# Patient Record
Sex: FEMALE | Race: WHITE | NOT HISPANIC OR LATINO | Employment: FULL TIME | URBAN - METROPOLITAN AREA
[De-identification: names, ages, dates, MRNs, and addresses within clinical notes are randomized per-mention and may not be internally consistent; named-entity substitution may affect disease eponyms.]

---
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
## 2019-03-26 ENCOUNTER — LAB (OUTPATIENT)
Dept: LAB | Facility: HOSPITAL | Age: 19
End: 2019-03-26

## 2019-03-26 ENCOUNTER — CONSULT (OUTPATIENT)
Dept: CARDIOLOGY | Facility: CLINIC | Age: 19
End: 2019-03-26

## 2019-03-26 VITALS
HEIGHT: 69 IN | OXYGEN SATURATION: 99 % | SYSTOLIC BLOOD PRESSURE: 102 MMHG | DIASTOLIC BLOOD PRESSURE: 74 MMHG | WEIGHT: 273 LBS | BODY MASS INDEX: 40.43 KG/M2 | HEART RATE: 86 BPM

## 2019-03-26 DIAGNOSIS — R55 SYNCOPE AND COLLAPSE: ICD-10-CM

## 2019-03-26 DIAGNOSIS — R07.89 CHEST PAIN, MUSCULOSKELETAL: ICD-10-CM

## 2019-03-26 DIAGNOSIS — E66.01 MORBID OBESITY WITH BMI OF 40.0-44.9, ADULT (HCC): ICD-10-CM

## 2019-03-26 DIAGNOSIS — R00.2 PALPITATIONS: Primary | ICD-10-CM

## 2019-03-26 DIAGNOSIS — R00.2 PALPITATIONS: ICD-10-CM

## 2019-03-26 LAB
ANION GAP SERPL CALCULATED.3IONS-SCNC: 11 MMOL/L (ref 3–11)
BASOPHILS # BLD AUTO: 0.03 10*3/MM3 (ref 0–0.2)
BASOPHILS NFR BLD AUTO: 0.3 % (ref 0–1)
BUN BLD-MCNC: 11 MG/DL (ref 9–23)
BUN/CREAT SERPL: 15.1 (ref 7–25)
CALCIUM SPEC-SCNC: 9.5 MG/DL (ref 8.7–10.4)
CHLORIDE SERPL-SCNC: 105 MMOL/L (ref 99–109)
CO2 SERPL-SCNC: 22 MMOL/L (ref 20–31)
CREAT BLD-MCNC: 0.73 MG/DL (ref 0.6–1.3)
DEPRECATED FTI SERPL-MCNC: 2.6 TBI
DEPRECATED RDW RBC AUTO: 42 FL (ref 37–54)
EOSINOPHIL # BLD AUTO: 0.17 10*3/MM3 (ref 0–0.3)
EOSINOPHIL NFR BLD AUTO: 1.7 % (ref 0–3)
ERYTHROCYTE [DISTWIDTH] IN BLOOD BY AUTOMATED COUNT: 13.3 % (ref 11.3–14.5)
GFR SERPL CREATININE-BSD FRML MDRD: 102 ML/MIN/1.73
GLUCOSE BLD-MCNC: 76 MG/DL (ref 70–100)
HCT VFR BLD AUTO: 40.8 % (ref 34.5–44)
HGB BLD-MCNC: 13.5 G/DL (ref 11.5–15.5)
IMM GRANULOCYTES # BLD AUTO: 0.02 10*3/MM3 (ref 0–0.05)
IMM GRANULOCYTES NFR BLD AUTO: 0.2 % (ref 0–0.6)
LYMPHOCYTES # BLD AUTO: 3.14 10*3/MM3 (ref 0.6–4.8)
LYMPHOCYTES NFR BLD AUTO: 31.7 % (ref 24–44)
MCH RBC QN AUTO: 28.5 PG (ref 27–31)
MCHC RBC AUTO-ENTMCNC: 33.1 G/DL (ref 32–36)
MCV RBC AUTO: 86.3 FL (ref 80–99)
MONOCYTES # BLD AUTO: 0.5 10*3/MM3 (ref 0–1)
MONOCYTES NFR BLD AUTO: 5.1 % (ref 0–12)
NEUTROPHILS # BLD AUTO: 6.06 10*3/MM3 (ref 1.5–8.3)
NEUTROPHILS NFR BLD AUTO: 61.2 % (ref 41–71)
PLATELET # BLD AUTO: 339 10*3/MM3 (ref 150–450)
PMV BLD AUTO: 10.3 FL (ref 6–12)
POTASSIUM BLD-SCNC: 4.5 MMOL/L (ref 3.5–5.5)
RBC # BLD AUTO: 4.73 10*6/MM3 (ref 3.89–5.14)
SODIUM BLD-SCNC: 138 MMOL/L (ref 132–146)
T3RU NFR SERPL: 27 % (ref 23–37)
T4 SERPL-MCNC: 9.6 MCG/DL (ref 4.7–11.4)
TSH SERPL DL<=0.05 MIU/L-ACNC: 2.11 MIU/ML (ref 0.35–5.35)
WBC NRBC COR # BLD: 9.9 10*3/MM3 (ref 4.5–13.5)

## 2019-03-26 PROCEDURE — 80048 BASIC METABOLIC PNL TOTAL CA: CPT

## 2019-03-26 PROCEDURE — 36415 COLL VENOUS BLD VENIPUNCTURE: CPT

## 2019-03-26 PROCEDURE — 93000 ELECTROCARDIOGRAM COMPLETE: CPT | Performed by: PHYSICIAN ASSISTANT

## 2019-03-26 PROCEDURE — 84436 ASSAY OF TOTAL THYROXINE: CPT

## 2019-03-26 PROCEDURE — 85025 COMPLETE CBC W/AUTO DIFF WBC: CPT

## 2019-03-26 PROCEDURE — 84479 ASSAY OF THYROID (T3 OR T4): CPT

## 2019-03-26 PROCEDURE — 99204 OFFICE O/P NEW MOD 45 MIN: CPT | Performed by: PHYSICIAN ASSISTANT

## 2019-03-26 PROCEDURE — 84443 ASSAY THYROID STIM HORMONE: CPT

## 2019-03-26 NOTE — PROGRESS NOTES
"[facility] at [facility]  INITIAL OFFICE CONSULT      Tracey Merrill  2000  PCP: Aron Tate MD    SUBJECTIVE:   Tracey Merrill is a 18 y.o. female seen for a consultation visit regarding the following:     Chief Complaint:   Chief Complaint   Patient presents with   • Dizziness     CONSULT   • Irregular Heart Beat   • Nausea   • Palpitations        Consultation is requested by Jose Healy MD for evaluation of Dizziness (CONSULT); Irregular Heart Beat; Nausea; and Palpitations    History:  The patient is a pleasant 18-year-old female presents today for follow-up at the request of her pediatrician Dr. Tate regarding history of near syncope and syncope events.  She is accompanied by her mother.  She states that she initially had syncope episodes dating back to when she was in high school.  However the episodes were very infrequent.  Recently these have increased in nature to the point where she has had 3 or 4 in the past 6 months.  She states usually these occur when she is standing she feels fatigued weeks she begins to feel nauseous diaphoretic and she feels as though she may go out she has to sit down or rest.  During this time she also feels that her heart rate is racing.  She has had a few episodes where her heart seems to be racing for no reason most the time this is when she is up standing but she has had occasional episodes when she is lying down on the bed.  She sometimes can feel her pulse in her \"legs\".  She denies sudden random syncope events with no warning signs she typically has a prodrome of symptoms.  She denies any chest pain with exertion suggesting angina.  She was treated for muscle skeletal chest pain last month which is now resolved.  She states that she been very labile in her diet she sometimes \"fast\" for hours and sometimes work out in the gym for a long period of time without much rest.  She feels sometimes these episodes are triggered by \"low " "blood sugar\".  She states that she has regular periods on birth control denies that she is pregnant.  She did present to her primary care provider on 319 and during this office visit she was told that her blood pressure dropped when she stood up.  She presents today for further evaluation regarding these events.  She denies she is never had any seizure disorder or seizure-like activity.  It is important also notes she has had recent trouble with headaches and she sometimes sees vision changes with these headaches but she is not sure if she has had prior diagnosis of migraine headaches.      Cardiac PMH: (Old records have been reviewed and summarized below)  1. Vasovagal syncope  2. Palpitations  3. Mild Obesity  4. Anxiety   5. Frequent headaches, possible migraines.  6. Marginal SBP      Past Medical History, Past Surgical History, Family history, Social History, and Medications were all reviewed with the patient today and updated as necessary.     No current outpatient medications on file.     No current facility-administered medications for this visit.      No Known Allergies      Past Medical History:   Diagnosis Date   • Depression      History reviewed. No pertinent surgical history.  Family History   Problem Relation Age of Onset   • No Known Problems Mother    • No Known Problems Father    • Aneurysm Paternal Grandmother    • Aneurysm Paternal Grandfather      Social History     Tobacco Use   • Smoking status: Never Smoker   • Smokeless tobacco: Never Used   Substance Use Topics   • Alcohol use: No     Frequency: Never       ROS:  Review of Symptoms:  General: episodes ofweight loss/gain,with binge eating.   Skin: no rashes, lumps, or other skin changes  HEENT: + dizziness, lightheadedness, + vision changes. + headaches  Respiratory: no cough or hemoptysis  Cardiovascular: + palpitations, and tachycardia  Gastrointestinal: no black/tarry stools or diarrhea  Urinary: no change in frequency or " "urgency  Peripheral Vascular: no claudication or leg cramps  Musculoskeletal: no muscle or joint pain/stiffness  Psychiatric: + anxiety   Neurological: no sensory or motor loss, no syncope  Hematologic: no anemia, easy bruising or bleeding  Endocrine: no thyroid problems, nor heat or cold intolerance         PHYSICAL EXAM:   /74 (BP Location: Left arm, Patient Position: Sitting)   Pulse 86   Ht 175.3 cm (69\")   Wt 124 kg (273 lb)   SpO2 99%   BMI 40.32 kg/m²      Wt Readings from Last 5 Encounters:   03/26/19 124 kg (273 lb)   09/18/14 101 kg (223 lb 2 oz) (>99 %, Z= 2.33)*     * Growth percentiles are based on [facility] (Girls, 2-20 Years) data.     BP Readings from Last 5 Encounters:   03/26/19 102/74   09/18/14 110/82 (49 %, Z = -0.03 /  94 %, Z = 1.54)*     *BP percentiles are based on the August 2017 AAP Clinical Practice Guideline for girls       General-Well Nourished, Well developed  Eyes - PERRLA  Neck- supple, No mass  CV- regular rate and rhythm, no MRG  Lung- clear bilaterally  Abd- soft, +BS  Musc/skel - Norm strength and range of motion  Skin- warm and dry  Neuro - Alert & Oriented x 3, appropriate mood.    Medical problems and test results were reviewed with the patient today.     Results for orders placed or performed in visit on 11/02/17   C-reactive Protein   Result Value Ref Range    C-Reactive Protein 0.80 0.00 - 1.00 mg/dL   Comprehensive Metabolic Panel   Result Value Ref Range    Glucose 82 70 - 100 mg/dL    BUN 10 9 - 23 mg/dL    Creatinine 0.70 0.60 - 1.30 mg/dL    Sodium 142 132 - 146 mmol/L    Potassium 4.0 3.5 - 5.5 mmol/L    Chloride 109 99 - 109 mmol/L    CO2 26.0 20.0 - 31.0 mmol/L    Calcium 9.3 8.7 - 10.4 mg/dL    Total Protein 6.0 5.7 - 8.2 g/dL    Albumin 4.00 3.20 - 4.80 g/dL    ALT (SGPT) 22 7 - 40 U/L    AST (SGOT) 17 0 - 33 U/L    Alkaline Phosphatase 96 25 - 100 U/L    Total Bilirubin 0.5 0.3 - 1.2 mg/dL    eGFR Non African Amer 108 >60 mL/min/1.73    Globulin 2.0 gm/dL "    A/G Ratio 2.0 1.5 - 2.5 g/dL    BUN/Creatinine Ratio 14.3 7.0 - 25.0    Anion Gap 7.0 3.0 - 11.0 mmol/L   Insulin, Total   Result Value Ref Range    Insulin 16.8 2.6 - 24.9 uIU/mL   Hemoglobin A1c   Result Value Ref Range    Hemoglobin A1C 5.00 4.80 - 5.60 %   T4, Free   Result Value Ref Range    Free T4 1.09 0.89 - 1.76 ng/dL   TSH   Result Value Ref Range    TSH 2.275 0.350 - 5.350 mIU/mL   CBC Auto Differential   Result Value Ref Range    WBC 7.45 4.50 - 13.50 10*3/mm3    RBC 4.26 3.89 - 5.14 10*6/mm3    Hemoglobin 11.8 11.5 - 15.5 g/dL    Hematocrit 36.3 34.5 - 44.0 %    MCV 85.2 80.0 - 99.0 fL    MCH 27.7 27.0 - 31.0 pg    MCHC 32.5 32.0 - 36.0 g/dL    RDW 14.8 (H) 11.3 - 14.5 %    RDW-SD 46.0 37.0 - 54.0 fl    MPV 10.9 6.0 - 12.0 fL    Platelets 252 150 - 450 10*3/mm3    Neutrophil % 47.3 41.0 - 71.0 %    Lymphocyte % 44.7 (H) 24.0 - 44.0 %    Monocyte % 4.8 0.0 - 12.0 %    Eosinophil % 2.4 0.0 - 3.0 %    Basophil % 0.7 0.0 - 1.0 %    Immature Grans % 0.1 0.0 - 0.6 %    Neutrophils, Absolute 3.52 1.50 - 8.30 10*3/mm3    Lymphocytes, Absolute 3.33 0.60 - 4.80 10*3/mm3    Monocytes, Absolute 0.36 0.00 - 1.00 10*3/mm3    Eosinophils, Absolute 0.18 0.00 - 0.30 10*3/mm3    Basophils, Absolute 0.05 0.00 - 0.20 10*3/mm3    Immature Grans, Absolute 0.01 0.00 - 0.03 10*3/mm3         No results found for: CHOL, HDL, HDLC, LDL, LDLC, VLDL    EKG:  (EKG/Tracing has been independently visualized by me and summarized below)      ECG 12 Lead  Date/Time: 3/26/2019 4:28 PM  Performed by: Uriel Chin PA  Authorized by: Uriel Chin PA   Rhythm: sinus rhythm  Rate: normal  Conduction: conduction normal  QRS axis: normal    Clinical impression: normal ECG            ASSESSMENT   1. Near syncope, Vasovagal.   2. Marginal SBP  3. Moderate Obesity  4. Questionable Migraines  5. Palpitations    PLAN  · Today I had a long discussion with the patient and her mother  with her today in the office.  We discussed signs  and symptoms of vasovagal syncope versus possible pots syndrome.  We have recommended that she stop her fasting diets and pursue a regular diet with regular meals, reduce caffeine intake with improved sleep habbits.  In addition to supplement this would like her to increase her hydration with Gatorade and Powerade as well as increase her sodium intake. She will wear ALLISON hose at work.   · Echocardiogram for LV function  · MCOT monitor regarding random episodes of palpitations. Suspect some of her symptoms are secondary low BP.   · Routine laboratory data including CBC, BMP, thyroid panel  · Tilt table study  · Further recommendations following results of above study and lifestyle modifications.  If no improvement consider Florinef therapy.   · Follow-up in [facility] in approximately 1 month or sooner as needed.     Cardiology/Electrophysiology  03/26/19  4:35 PM  Will Elsy AGUAYO

## 2019-03-27 ENCOUNTER — DOCUMENTATION (OUTPATIENT)
Dept: CARDIOLOGY | Facility: CLINIC | Age: 19
End: 2019-03-27

## 2019-04-30 ENCOUNTER — HOSPITAL ENCOUNTER (OUTPATIENT)
Dept: CARDIOLOGY | Facility: HOSPITAL | Age: 19
Discharge: HOME | End: 2019-04-30
Admitting: PHYSICIAN ASSISTANT

## 2019-04-30 ENCOUNTER — APPOINTMENT (OUTPATIENT)
Dept: CARDIOLOGY | Facility: HOSPITAL | Age: 19
End: 2019-04-30

## 2019-04-30 DIAGNOSIS — R55 SYNCOPE AND COLLAPSE: ICD-10-CM

## 2019-04-30 DIAGNOSIS — R00.2 PALPITATIONS: ICD-10-CM

## 2019-04-30 PROCEDURE — 93660 TILT TABLE EVALUATION: CPT

## 2019-04-30 PROCEDURE — 93660 TILT TABLE EVALUATION: CPT | Performed by: INTERNAL MEDICINE

## 2019-05-16 ENCOUNTER — OFFICE VISIT (OUTPATIENT)
Dept: CARDIOLOGY | Facility: CLINIC | Age: 19
End: 2019-05-16

## 2019-05-16 VITALS
HEIGHT: 69 IN | WEIGHT: 272 LBS | DIASTOLIC BLOOD PRESSURE: 78 MMHG | SYSTOLIC BLOOD PRESSURE: 110 MMHG | HEART RATE: 81 BPM | BODY MASS INDEX: 40.29 KG/M2

## 2019-05-16 DIAGNOSIS — R00.2 PALPITATIONS: ICD-10-CM

## 2019-05-16 DIAGNOSIS — R55 SYNCOPE AND COLLAPSE: Primary | ICD-10-CM

## 2019-05-16 PROCEDURE — 99214 OFFICE O/P EST MOD 30 MIN: CPT | Performed by: INTERNAL MEDICINE

## 2019-05-16 NOTE — PROGRESS NOTES
"Tracey Merrill  2000  [phone]    05/16/2019    [facility] CARDIOLOGY     Aron Tate MD  [address] [place] [address]   [place] [address]    Chief Complaint   Patient presents with   • Palpitations   • Dizziness       Problem List:   1. Vasovagal Syncope  a. Event Monitor 3/26/19-4/24/19: NSR with sinus tachycardia, up to 132 bpm. Symptoms correlated with Sinus Tachycardia  b. Tilt Table Test 4/30/19: positive vasodepressor response at 19 minutes of head up tilt, test terminated due to syncope. BP dropped to 72/52 just prior to syncope, HR 57  2. Mild obesity  3. Anxiety  4. Frequent HAs  No past surgical history on file.          Allergies  Allergies   Allergen Reactions   • Latex Rash     PT stated allergic to latex powder       Current Medications  No current outpatient medications on file.    History of Present Illness     Pt presents for follow up of vasovagal syncope. Since we last saw the pt, she had a tilt table test on 4/30/19 which was positive. She wore an event monitor which showed ST. The week after the tilt table, she had a couple episodes of syncope, both while sitting. She has warning signs of tunnel vision and visual disturbances, along with nausea, and diaphoresis. She denies any recent palpitations, CP, SOB other than when she has vaso vagal episodes. No recent hospitalzations or ER visits. She hydrates well and states that it does help. She had normal labs with TSH, BMP, and CBC.     ROS:  General:  +  Fatigue, - weight gain or loss  Cardiovascular:  Denies CP, PND,+ syncope,+ near syncope, - edema + palpitations.  Pulmonary:  Denies MALONEY, cough, or wheezing      Vitals:    05/16/19 1026   BP: 110/78   BP Location: Left arm   Patient Position: Sitting   Pulse: 81   Weight: 123 kg (272 lb)   Height: 175.3 cm (69\")     Body mass index is 40.17 kg/m².  PE:  General: NAD. A & O x 3  Neck: no JVD, no carotid bruits, no TM  Heart RRR, NL S1, S2, S4 present, " no rubs, murmurs  Lungs: CTA, no wheezes, rhonchi, or rales  Abd: soft, non-tender, NL BS  Ext: No musculoskeletal deformities, no edema, cyanosis, or clubbing  Psych: normal mood and affect    Diagnostic Data:      Procedures    1. Syncope and collapse    2. Palpitations          Plan:  1. Vasovagal Syncope: Discussed all the triggers in detail with her and mom. Continue fluids, salt, exercise  - will start midodrine 5 mg po bid  - echocardiogram ordered, patient wants to have it done at [place].     2. Sinus tachycardia/Palpitations:   - symptoms mild    F/up in 6 months    Scribed for Juwan Jenkins MD by Latanya Medel PA-C. 5/16/2019  11:06 AM     I, Juwan Jenkins MD, personally performed the services described in this documentation as scribed by the above named individual in my presence, and it is both accurate and complete.  5/16/2019  11:17 AM

## 2019-05-20 RX ORDER — MIDODRINE HYDROCHLORIDE 5 MG/1
5 TABLET ORAL 2 TIMES DAILY
Qty: 60 TABLET | Refills: 11 | Status: SHIPPED | OUTPATIENT
Start: 2019-05-20 | End: 2019-10-17 | Stop reason: SINTOL

## 2019-10-17 ENCOUNTER — OFFICE VISIT (OUTPATIENT)
Dept: CARDIOLOGY | Facility: CLINIC | Age: 19
End: 2019-10-17

## 2019-10-17 VITALS
BODY MASS INDEX: 41.03 KG/M2 | HEART RATE: 88 BPM | DIASTOLIC BLOOD PRESSURE: 88 MMHG | SYSTOLIC BLOOD PRESSURE: 110 MMHG | HEIGHT: 69 IN | OXYGEN SATURATION: 95 % | WEIGHT: 277 LBS

## 2019-10-17 DIAGNOSIS — R00.2 PALPITATIONS: ICD-10-CM

## 2019-10-17 DIAGNOSIS — R55 SYNCOPE AND COLLAPSE: Primary | ICD-10-CM

## 2019-10-17 PROCEDURE — 99213 OFFICE O/P EST LOW 20 MIN: CPT | Performed by: PHYSICIAN ASSISTANT

## 2019-10-17 NOTE — PROGRESS NOTES
"Tracey Merrill  2000  [phone]    10/17/2019    [facility] CARDIOLOGY     Aron Tate MD  [address]   [place] [address]    Chief Complaint   Patient presents with   • Loss of Consciousness       Problem List:   1. Vasovagal Syncope/Orthostatic  a. Event Monitor 3/26/19-4/24/19: NSR with sinus tachycardia, up to 132 bpm. Symptoms correlated with Sinus Tachycardia  b. Tilt Table Test 4/30/19: positive vasodepressor response at 19 minutes of head up tilt, test terminated due to syncope. BP dropped to 72/52 just prior to syncope, HR 57  2. Mild obesity  3. Anxiety  4. Frequent HAs    Allergies  Allergies   Allergen Reactions   • Midodrine Hcl Itching     Itching and chill bumps   • Latex Rash     PT stated allergic to latex powder       Current Medications  No current outpatient medications on file.    History of Present Illness     Pt presents for follow up of VVS and tachycardia. Since we last saw the pt, she stopped taking Midodrine as she was intolerant with chills bumps and itching. She has overall been stable and knows when she feels like she might pass out and is able to sit down. She had one syncopal spell when she had a GI illness. She states that she can deal with it. She denies palpitations or tachycardia. She denies CP, SOB. No recent hospitalizations or ER visits.     ROS:  General:  Denies fatigue, weight gain or loss  Cardiovascular:  Denies CP, PND, + syncope, + near syncope, - edema - palpitations.  Pulmonary:  Denies MALONEY, cough, or wheezing      Vitals:    10/17/19 1113   BP: 110/88   BP Location: Left arm   Patient Position: Sitting   Pulse: 88   SpO2: 95%   Weight: 126 kg (277 lb)   Height: 175.3 cm (69\")     Body mass index is 40.91 kg/m².  PE:  General: NAD. A & O x 3  Neck: no JVD, no carotid bruits, no TM  Heart RRR, NL S1, S2, S4 present, no rubs, murmurs  Lungs: CTA, no wheezes, rhonchi, or rales  Abd: soft, non-tender, NL " BS  Ext: No musculoskeletal deformities, no edema, cyanosis, or clubbing  Psych: normal mood and affect    Diagnostic Data:      Procedures    1. Syncope and collapse    2. Palpitations          Plan:  1. Vasovagal Syncope:   - off Midodrine due to side effects, but did not stay on it long enough. Gave her options to re challenge Midodrine vs lifestyle modification vs florinef. She would like to try lifestyle modification for now. Continue fluids, salt, and exercise.   - needs echocardiogram. She wants it done in [place]. Will have our  set that up. It is already ordered.      2. Sinus tachycardia/Palpitations:   - symptoms mild     F/up in 6 months    Electronically signed by NICO Alonso, 10/17/19, 12:02 PM.

## 2020-06-18 ENCOUNTER — OFFICE VISIT (OUTPATIENT)
Dept: CARDIOLOGY | Facility: CLINIC | Age: 20
End: 2020-06-18

## 2020-06-18 VITALS
OXYGEN SATURATION: 98 % | SYSTOLIC BLOOD PRESSURE: 108 MMHG | HEIGHT: 69 IN | DIASTOLIC BLOOD PRESSURE: 70 MMHG | HEART RATE: 103 BPM | WEIGHT: 290 LBS | BODY MASS INDEX: 42.95 KG/M2

## 2020-06-18 DIAGNOSIS — R55 SYNCOPE AND COLLAPSE: Primary | ICD-10-CM

## 2020-06-18 DIAGNOSIS — R00.0 SINUS TACHYCARDIA: ICD-10-CM

## 2020-06-18 PROCEDURE — 99213 OFFICE O/P EST LOW 20 MIN: CPT | Performed by: INTERNAL MEDICINE

## 2020-06-18 RX ORDER — METFORMIN HYDROCHLORIDE 500 MG/1
TABLET, EXTENDED RELEASE ORAL EVERY OTHER DAY
COMMUNITY
Start: 2020-06-01 | End: 2022-02-01

## 2020-06-18 NOTE — PROGRESS NOTES
"Tracey Merrill  2000  [phone]      06/18/2020    [facility] CARDIOLOGY     Aron Tate MD  [address]   [place] [address]    Chief Complaint   Patient presents with   • Loss of Consciousness       No problems updated.     Problem List:   1. Vasovagal Syncope/Orthostatic  a. Event Monitor 3/26/19-4/24/19: NSR with sinus tachycardia, up to 132 bpm. Symptoms correlated with Sinus Tachycardia  b. Tilt Table Test 4/30/19: positive vasodepressor response at 19 minutes of head up tilt, test terminated due to syncope. BP dropped to 72/52 just prior to syncope, HR 57  2. Mild obesity  3. Anxiety  4. Frequent HAs    Allergies  Allergies   Allergen Reactions   • Midodrine Hcl Itching     Itching and chill bumps   • Latex Rash     PT stated allergic to latex powder       Current Medications    Current Outpatient Medications:   •  metFORMIN ER (GLUCOPHAGE-XR) 500 MG 24 hr tablet, Take  by mouth Every Other Day., Disp: , Rfl:     History of Present Illness   HPI    Pt presents for follow up of VVS. Since the pt has seen us, pt denies any kennedy syncopal episodes, SOB, CP.  He has had some intermittent episodes of lightheadedness but they have been overall mild.  No sustained tachyarrhythmias.  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  He did not get her echocardiogram that we ordered last time as it was denied by the insurance.  Denies any hospitalizations or ER visits. Overall feels well.    ROS:  General:  + mild fatigue, no weight gain or loss  Cardiovascular:  Denies CP, PND, syncope, near syncope, edema or palpitations.  Pulmonary:  Denies MALONEY, cough, or wheezing    Vitals:    06/18/20 1159   BP: 108/70   BP Location: Left arm   Patient Position: Sitting   Pulse: 103   SpO2: 98%   Weight: 132 kg (290 lb)   Height: 175.3 cm (69\")       PE:  General: NAD  Neck: no JVD, no carotid bruits, no TM  Heart RRR, NL S1, S2, no rubs, " murmurs  Lungs: CTA, no wheezes, rhonchi, or rales  Abd: soft, non-tender, NL BS  Ext: No musculoskeletal deformities, no edema, cyanosis, or clubbing  Psych: normal mood and affect    Diagnostic Data:  Procedures    1. Syncope and collapse    2. Sinus tachycardia        Plan:  1. Vasovagal Syncope: Doing reasonably well at this time with episodes of lightheadedness but no kennedy syncope.  She needs an echocardiogram for evaluation of her syncope.  We will reattempt to obtain echocardiogram through her insurance company.       2. Sinus tachycardia/Palpitations:   - symptoms mild     F/up in 12 months

## 2021-07-13 DIAGNOSIS — R00.0 SINUS TACHYCARDIA: ICD-10-CM

## 2021-07-13 DIAGNOSIS — R55 SYNCOPE AND COLLAPSE: Primary | ICD-10-CM

## 2021-07-13 DIAGNOSIS — R00.2 PALPITATIONS: ICD-10-CM

## 2021-07-15 ENCOUNTER — OFFICE VISIT (OUTPATIENT)
Dept: CARDIOLOGY | Facility: CLINIC | Age: 21
End: 2021-07-15

## 2021-07-15 VITALS
BODY MASS INDEX: 43.4 KG/M2 | OXYGEN SATURATION: 96 % | HEIGHT: 69 IN | HEART RATE: 93 BPM | WEIGHT: 293 LBS | DIASTOLIC BLOOD PRESSURE: 84 MMHG | SYSTOLIC BLOOD PRESSURE: 112 MMHG

## 2021-07-15 DIAGNOSIS — R00.0 SINUS TACHYCARDIA: ICD-10-CM

## 2021-07-15 DIAGNOSIS — R55 SYNCOPE AND COLLAPSE: Primary | ICD-10-CM

## 2021-07-15 DIAGNOSIS — R00.2 PALPITATIONS: ICD-10-CM

## 2021-07-15 PROCEDURE — 99213 OFFICE O/P EST LOW 20 MIN: CPT | Performed by: INTERNAL MEDICINE

## 2021-07-15 NOTE — PROGRESS NOTES
"Tracey Merrill  2000  [phone]      07/15/2021    [facility] CARDIOLOGY     Provider, No Known  [facility]  [place] [address]    Chief Complaint   Patient presents with   • syncope and collapse       Problem List:   1. Vasovagal Syncope/Orthostatic  a. Event Monitor 3/26/19-4/24/19: NSR with sinus tachycardia, up to 132 bpm. Symptoms correlated with Sinus Tachycardia  b. Tilt Table Test 4/30/19: positive vasodepressor response at 19 minutes of head up tilt, test terminated due to syncope. BP dropped to 72/52 just prior to syncope, HR 57  2. Mild obesity  3. Anxiety  4. DMr  5. Frequent HAs      Allergies  Allergies   Allergen Reactions   • Midodrine Hcl Itching     Itching and chill bumps   • Latex Rash     PT stated allergic to latex powder       Current Medications    Current Outpatient Medications:   •  metFORMIN ER (GLUCOPHAGE-XR) 500 MG 24 hr tablet, Take  by mouth Every Other Day., Disp: , Rfl:     History of Present Illness   HPI    Pt presents for follow up of VVS/ST. Since the pt has seen us, pt denies any syncopal episodes, SOB, CP.  She does have periods of time where she develops lightheadedness but no near syncope.  She denies any significant tachypalpitations.  She does not check her blood pressure or heart rates routinely at home.  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  Denies any hospitalizations or ER visits. Overall feels ok set for chronic nasal drainage.  She has been started on Metformin since we have last seen her for prediabetic situation    ROS:  General:+  fatigue, + weight gain  Cardiovascular:  Denies CP, PND, syncope, near syncope, edema or palpitations.  Pulmonary:  Denies MALONEY, cough, or wheezing    Vitals:    07/15/21 1015   BP: 112/84   BP Location: Left arm   Patient Position: Sitting   Pulse: 93   SpO2: 96%   Weight: (!) 153 kg (338 lb)   Height: 175.3 cm (69\")       PE:  General: NAD  Neck: no JVD, " no carotid bruits, no TM  Heart RRR, NL S1, S2,  no rubs, murmurs  Lungs: CTA, no wheezes, rhonchi, or rales  Abd: soft, non-tender, NL BS  Ext: No musculoskeletal deformities, no edema, cyanosis, or clubbing  Psych: normal mood and affect    Diagnostic Data:  Procedures    1. Syncope and collapse    2. Palpitations    3. Sinus tachycardia        Plan:  1. Vasovagal Syncope: Doing reasonably well at this time with episodes of lightheadedness but no kennedy syncope.  She needs an echocardiogram for evaluation of her syncope which will be arranged through Dr. Dennison's office as an outpatient.  Encourage weight loss, exercise, routine salt use, and maintaining hydration.        2. Sinus tachycardia/Palpitations: Asymptomatic at this time    F/up in 12 months

## 2022-02-01 ENCOUNTER — OFFICE VISIT (OUTPATIENT)
Dept: PULMONOLOGY | Facility: CLINIC | Age: 22
End: 2022-02-01

## 2022-02-01 VITALS
RESPIRATION RATE: 18 BRPM | SYSTOLIC BLOOD PRESSURE: 114 MMHG | DIASTOLIC BLOOD PRESSURE: 80 MMHG | WEIGHT: 293 LBS | HEART RATE: 92 BPM | OXYGEN SATURATION: 97 % | HEIGHT: 69 IN | TEMPERATURE: 97.8 F | BODY MASS INDEX: 43.4 KG/M2

## 2022-02-01 DIAGNOSIS — R91.1 PULMONARY NODULE: ICD-10-CM

## 2022-02-01 DIAGNOSIS — F17.200 TOBACCO DEPENDENCE: ICD-10-CM

## 2022-02-01 DIAGNOSIS — R06.02 SHORTNESS OF BREATH: Primary | ICD-10-CM

## 2022-02-01 DIAGNOSIS — J41.1 BRONCHITIS, MUCOPURULENT RECURRENT: ICD-10-CM

## 2022-02-01 PROCEDURE — 94726 PLETHYSMOGRAPHY LUNG VOLUMES: CPT | Performed by: INTERNAL MEDICINE

## 2022-02-01 PROCEDURE — 94010 BREATHING CAPACITY TEST: CPT | Performed by: INTERNAL MEDICINE

## 2022-02-01 PROCEDURE — 99205 OFFICE O/P NEW HI 60 MIN: CPT | Performed by: INTERNAL MEDICINE

## 2022-02-01 PROCEDURE — 94729 DIFFUSING CAPACITY: CPT | Performed by: INTERNAL MEDICINE

## 2022-02-01 NOTE — PROGRESS NOTES
Initial Pulmonary Consult Note    Subjective   Reason for consultation/Chief Complaint: Abnormal Chest Xray    Tracey Merrill is a 21 y.o. female is being seen for consultation today at the request of CAROLINE Corley    History of Present Illness    Ms. Merrill is a 22yo F who is referred for an abnormal Chest xray. She was sick in December with a bad bronchitis and had a CXR performed which showed a right lower lobe pulmonary nodule. She is now over her bronchitis. She reports that she quit smoking 3 weeks ago.     Active Ambulatory Problems     Diagnosis Date Noted   • Syncope and collapse 2019   • Palpitations 2019   • Chest pain, musculoskeletal 2019   • Morbid obesity with BMI of 40.0-44.9, adult ([facility]) 2019   • Pulmonary nodule 2022   • Bronchitis, mucopurulent recurrent ([facility]) 2022   • Tobacco dependence 2022     Resolved Ambulatory Problems     Diagnosis Date Noted   • No Resolved Ambulatory Problems     Past Medical History:   Diagnosis Date   • Depression        History reviewed. No pertinent surgical history.    Family History   Problem Relation Age of Onset   • Aneurysm Paternal Grandmother    • Aneurysm Paternal Grandfather        Social History     Socioeconomic History   • Marital status: Single   Tobacco Use   • Smoking status: Former Smoker     Packs/day: 1.00     Years: 4.00     Pack years: 4.00     Types: Cigarettes     Quit date: 2022     Years since quittin.0   • Smokeless tobacco: Never Used   • Tobacco comment: 4 CAD   Substance and Sexual Activity   • Alcohol use: No   • Drug use: No   • Sexual activity: Defer       Allergies   Allergen Reactions   • Midodrine Hcl Itching     Itching and chill bumps   • Latex Rash     PT stated allergic to latex powder       No current outpatient medications on file.     No current facility-administered medications for this visit.       Review of Systems   Constitutional: Negative.    HENT: Negative.  "   Eyes: Negative.    Respiratory: Positive for cough.    Cardiovascular: Negative.    Gastrointestinal: Negative.    Endocrine: Negative.    Genitourinary: Negative.    Musculoskeletal: Negative.    Skin: Negative.    Allergic/Immunologic: Negative.    Neurological: Negative.    Hematological: Negative.    Psychiatric/Behavioral: Negative.          Objective   Blood pressure 114/80, pulse 92, temperature 97.8 °F (36.6 °C), resp. rate 18, height 175.3 cm (69\"), weight (!) 151 kg (332 lb), SpO2 97 %.  Physical Exam  Vitals and nursing note reviewed.   Constitutional:       General: She is not in acute distress.     Appearance: She is well-developed. She is obese.   HENT:      Head: Normocephalic and atraumatic.   Eyes:      General: No scleral icterus.     Conjunctiva/sclera: Conjunctivae normal.      Pupils: Pupils are equal, round, and reactive to light.   Neck:      Thyroid: No thyromegaly.      Trachea: No tracheal deviation.   Cardiovascular:      Rate and Rhythm: Normal rate and regular rhythm.      Heart sounds: Normal heart sounds.   Pulmonary:      Effort: Pulmonary effort is normal. No respiratory distress.      Breath sounds: Normal breath sounds.   Abdominal:      General: Bowel sounds are normal.      Palpations: Abdomen is soft.      Tenderness: There is no abdominal tenderness.   Musculoskeletal:         General: Normal range of motion.      Cervical back: Normal range of motion and neck supple.   Lymphadenopathy:      Cervical: No cervical adenopathy.   Skin:     General: Skin is warm and dry.      Findings: No erythema or rash.   Neurological:      Mental Status: She is alert and oriented to person, place, and time.      Motor: No abnormal muscle tone.      Coordination: Coordination normal.   Psychiatric:         Speech: Speech normal.         Behavior: Behavior normal.         Judgment: Judgment normal.         PFTs:  Performed in clinic and personally reviewed.   There is no airway " obstruction.  The lung volumes are normal.  The DLCO is normal.     Imaging:  Chest xray performed in clinic and personally reviewed. This is a PA/Lateral film. The cardiac and mediastinal contours are within normal limits. There is a well circumscribed 2cm nodule in the right lower lobe. The lungs are otherwise clear. There is no pneumothorax or pleural effusion.    Impression: 2cm right lower lobe nodule.     Assessment/Plan   Diagnoses and all orders for this visit:    1. Shortness of breath (Primary)  -     XR Chest PA & Lateral  -     Pulmonary Function Test    2. Pulmonary nodule    3. Bronchitis, mucopurulent recurrent (HCC)    4. Tobacco dependence        Discussion:  Ms. Merrill is a 20yo F who is referred for an abnormal chest xray.     1. Right Lower Lobe Pulmonary Nodule  - 2cm right lower lobe pulmonary nodule which appears benign.   - We will get a non-contrast CT scan of the chest for further evaluation.     2. Recurrent Bronchitis  - She has a history of recurrent bronchitis while she was smoking.   - We will monitor for now as she recently quit smoking.   - If she continues to have episodes of bronchitis, she may benefit from ICS inhaler therapy.     3. Tobacco Dependence  - Quit smoking x 3 weeks.          I have spent 63 minutes reviewing the patient record, face to face with the patient and her mother in discussion of test results and plans for further management and in documentation and coordination of care.       Patti RINALDI Case, DO  Pulmonary and Critical Care Medicine  Note Electronically Signed

## 2022-02-02 DIAGNOSIS — R91.1 PULMONARY NODULE: Primary | ICD-10-CM

## 2022-02-17 ENCOUNTER — APPOINTMENT (OUTPATIENT)
Dept: CT IMAGING | Facility: HOSPITAL | Age: 22
End: 2022-02-17

## 2022-02-28 ENCOUNTER — HOSPITAL ENCOUNTER (OUTPATIENT)
Dept: CT IMAGING | Facility: HOSPITAL | Age: 22
Discharge: HOME | End: 2022-02-28
Admitting: NURSE PRACTITIONER

## 2022-02-28 DIAGNOSIS — R91.1 PULMONARY NODULE: ICD-10-CM

## 2022-02-28 PROCEDURE — 71250 CT THORAX DX C-: CPT

## 2022-03-04 DIAGNOSIS — R91.1 PULMONARY NODULE: Primary | ICD-10-CM

## 2022-07-21 ENCOUNTER — OFFICE VISIT (OUTPATIENT)
Dept: CARDIOLOGY | Facility: CLINIC | Age: 22
End: 2022-07-21

## 2022-07-21 ENCOUNTER — TELEPHONE (OUTPATIENT)
Dept: CARDIOLOGY | Facility: CLINIC | Age: 22
End: 2022-07-21

## 2022-07-21 VITALS
WEIGHT: 293 LBS | HEART RATE: 90 BPM | OXYGEN SATURATION: 98 % | SYSTOLIC BLOOD PRESSURE: 120 MMHG | BODY MASS INDEX: 41.95 KG/M2 | HEIGHT: 70 IN | DIASTOLIC BLOOD PRESSURE: 90 MMHG

## 2022-07-21 DIAGNOSIS — R55 SYNCOPE AND COLLAPSE: Primary | ICD-10-CM

## 2022-07-21 DIAGNOSIS — R00.2 PALPITATIONS: ICD-10-CM

## 2022-07-21 PROCEDURE — 99213 OFFICE O/P EST LOW 20 MIN: CPT | Performed by: INTERNAL MEDICINE

## 2022-07-21 NOTE — PROGRESS NOTES
"Tracey Merrill  2000  [phone]     07/21/2022    [facility] CARDIOLOGY     Referring Provider: No ref. provider found     Provider, No Known  [place] [id number]    Chief Complaint   Patient presents with   • syncope and collapse        Problem List:     1. Vasovagal Syncope/Orthostatic  a. Event Monitor 3/26/19-4/24/19: NSR with sinus tachycardia, up to 132 bpm. Symptoms correlated with Sinus Tachycardia  b. Tilt Table Test 4/30/19: positive vasodepressor response at 19 minutes of head up tilt, test terminated due to syncope. BP dropped to 72/52 just prior to syncope, HR 57  c. Echocardiogram 9/2021: EF 65%, no significant valve abnormalities   2. Mild obesity  3. Anxiety  4. DMr  5. Frequent HAs      Allergies  Allergies   Allergen Reactions   • Midodrine Hcl Itching     Itching and chill bumps   • Latex Rash     PT stated allergic to latex powder       Current Medications  No current outpatient medications on file.    History of Present Illness:     Pt presents for follow up of VVS. Since we last saw the pt, pt denies any tachycardia, SOB, CP, LH, and dizziness, syncope. Denies any hospitalizations, ER visits, bleeding, or TIA/CVA symptoms. Overall feels well. She is trying to lose weight. She has lost 30 lbs. She is interested in   saxenda and wegovy.      ROS:  General:  Denies fatigue, weight gain or loss  Cardiovascular:  Denies CP, PND, syncope, near syncope, edema or palpitations.  Pulmonary:  Denies MALONEY, cough, or wheezing      Vitals:    07/21/22 1334   BP: 120/90   BP Location: Left arm   Patient Position: Sitting   Pulse: 90   SpO2: 98%   Weight: (!) 149 kg (328 lb)   Height: 176.5 cm (69.5\")     Body mass index is 47.74 kg/m².  PE:  General: NAD  Neck: no JVD, no carotid bruits, no TM  Heart RRR, NL S1, S2, S4 present, no rubs, murmurs  Lungs: CTA, no wheezes, rhonchi, or rales  Abd: soft, non-tender, NL BS  Ext: No musculoskeletal deformities, no " edema, cyanosis, or clubbing  Psych: normal mood and affect    Diagnostic Data:      Procedures    1. Syncope and collapse    2. Palpitations          Plan:  1. Vasovagal Syncope:  -  No recurrent events.   -  Encourage weight loss, exercise, routine salt use, and maintaining hydration. She wants to take a weight loss medication. I will research them and call her and let her know.         2. Sinus tachycardia/Palpitations: Asymptomatic at this time    F/up in 6 months    Electronically signed by NICO Alonso, 07/21/22, 1:58 PM EDT.

## 2022-07-21 NOTE — TELEPHONE ENCOUNTER
----- Message from NICO Dunn sent at 7/21/2022  2:50 PM EDT -----  Janet,   Can you call this patient and tell her that we do not recommend Saxenda or Wegovy for weight loss, has risk of worsening tachycardia and low BP. Wegovy can also cause dehydration.   Thanks! I saw her in [facility] and she was asking. I looked it up, but unable to call her from [facility].

## 2022-08-09 ENCOUNTER — HOSPITAL ENCOUNTER (OUTPATIENT)
Dept: CT IMAGING | Facility: HOSPITAL | Age: 22
Discharge: HOME | End: 2022-08-09
Admitting: NURSE PRACTITIONER

## 2022-08-09 DIAGNOSIS — R91.1 PULMONARY NODULE: ICD-10-CM

## 2022-08-09 PROCEDURE — 71250 CT THORAX DX C-: CPT

## 2022-11-23 ENCOUNTER — OFFICE VISIT (OUTPATIENT)
Dept: PULMONOLOGY | Facility: CLINIC | Age: 22
End: 2022-11-23

## 2022-11-23 VITALS
HEIGHT: 70 IN | DIASTOLIC BLOOD PRESSURE: 88 MMHG | HEART RATE: 85 BPM | SYSTOLIC BLOOD PRESSURE: 118 MMHG | WEIGHT: 293 LBS | OXYGEN SATURATION: 100 % | TEMPERATURE: 98 F | BODY MASS INDEX: 41.95 KG/M2

## 2022-11-23 DIAGNOSIS — Z87.891 FORMER SMOKER: ICD-10-CM

## 2022-11-23 DIAGNOSIS — J41.1 BRONCHITIS, MUCOPURULENT RECURRENT: ICD-10-CM

## 2022-11-23 DIAGNOSIS — R91.1 PULMONARY NODULE: Primary | ICD-10-CM

## 2022-11-23 PROCEDURE — 99214 OFFICE O/P EST MOD 30 MIN: CPT | Performed by: INTERNAL MEDICINE

## 2022-11-23 RX ORDER — NALTREXONE HYDROCHLORIDE AND BUPROPION HYDROCHLORIDE 8; 90 MG/1; MG/1
2 TABLET, EXTENDED RELEASE ORAL TAKE AS DIRECTED
COMMUNITY
Start: 2022-10-05

## 2022-11-23 NOTE — PROGRESS NOTES
"Pulmonary Office Follow Up      Subjective   Chief Complaint: Pulmonary Nodule    Tracey Merrill is a 22 y.o. female is being seen in follow up for a lung nodule.     History of Present Illness    Ms. Merrill is a 21yo F who is followed for a pulmonary nodule.     She was last seen in clinic on 2/1/22.     Since her last visit, she has remained cigarette free.     The following portions of the patient's history were reviewed and updated as appropriate: allergies, current medications, past family history, past medical history, past social history, past surgical history and problem list.    Review of Systems   Constitutional: Negative.    HENT: Negative.    Eyes: Negative.    Respiratory: Negative.    Cardiovascular: Negative.    Gastrointestinal: Negative.    Endocrine: Negative.    Genitourinary: Negative.    Musculoskeletal: Negative.    Skin: Negative.    Allergic/Immunologic: Negative.    Neurological: Negative.    Hematological: Negative.    Psychiatric/Behavioral: Negative.        Objective   Blood pressure 118/88, pulse 85, temperature 98 °F (36.7 °C), temperature source Infrared, height 176.5 cm (69.5\"), weight (!) 150 kg (330 lb 3.2 oz), SpO2 100 %.  Physical Exam  Vitals and nursing note reviewed.   Constitutional:       General: She is not in acute distress.     Appearance: She is well-developed.   HENT:      Head: Normocephalic and atraumatic.   Eyes:      General: No scleral icterus.     Conjunctiva/sclera: Conjunctivae normal.      Pupils: Pupils are equal, round, and reactive to light.   Neck:      Thyroid: No thyromegaly.      Trachea: No tracheal deviation.   Cardiovascular:      Rate and Rhythm: Normal rate and regular rhythm.      Heart sounds: Normal heart sounds.   Pulmonary:      Effort: Pulmonary effort is normal. No respiratory distress.      Breath sounds: Normal breath sounds.   Abdominal:      General: Bowel sounds are normal.      Palpations: Abdomen is soft.      Tenderness: There is " no abdominal tenderness.   Musculoskeletal:         General: Normal range of motion.      Cervical back: Normal range of motion and neck supple.   Lymphadenopathy:      Cervical: No cervical adenopathy.   Skin:     General: Skin is warm and dry.      Findings: No erythema or rash.   Neurological:      Mental Status: She is alert and oriented to person, place, and time.      Motor: No abnormal muscle tone.      Coordination: Coordination normal.   Psychiatric:         Speech: Speech normal.         Behavior: Behavior normal.         Judgment: Judgment normal.         PFTs:  No new PFTs.     Imaging:  CT Chest from 8/9/22 reviewed. There is a stable 1.6 x 1.55cm right lower lobe pulmonary nodule.     Assessment & Plan   Diagnoses and all orders for this visit:    1. Pulmonary nodule (Primary)  -     CT Chest Without Contrast; Future    2. Bronchitis, mucopurulent recurrent (HCC)    3. Former smoker        Discussion:  Ms. Merrill is a 21yo F who is followed for a pulmonary nodule.      1. Right Lower Lobe Pulmonary Nodule  - 1.6 x 1.6cm nodule first noted in February 2022.   - Stable on most recent CT chest from August 2022.   - We will plan to get a repeat CT chest in February 2023 which will be 6 month from her prior scan. If the nodule is stable at this time, we will plan to repeat a scan in 1 year to document 2 years of stability.      2. Recurrent Bronchitis  - No episodes of bronchitis since her last visit.   - Consider an ICS inhaler if she develops bronchitis again.      3. Tobacco Dependence  - Quit in January 2022.   - Congratulated on continued abstinence.     Return to clinic in March after repeat CT chest.           Patti RINALDI Case, DO  Pulmonary and Critical Care Medicine  Note Electronically Signed

## 2023-03-20 ENCOUNTER — HOSPITAL ENCOUNTER (OUTPATIENT)
Dept: CT IMAGING | Facility: HOSPITAL | Age: 23
Discharge: HOME | End: 2023-03-20
Admitting: INTERNAL MEDICINE
Payer: COMMERCIAL

## 2023-03-20 DIAGNOSIS — R91.1 PULMONARY NODULE: ICD-10-CM

## 2023-03-20 PROCEDURE — 71250 CT THORAX DX C-: CPT

## 2023-08-17 ENCOUNTER — OFFICE VISIT (OUTPATIENT)
Dept: CARDIOLOGY | Facility: CLINIC | Age: 23
End: 2023-08-17
Payer: COMMERCIAL

## 2023-08-17 VITALS
DIASTOLIC BLOOD PRESSURE: 76 MMHG | WEIGHT: 293 LBS | BODY MASS INDEX: 41.95 KG/M2 | SYSTOLIC BLOOD PRESSURE: 110 MMHG | HEIGHT: 70 IN | OXYGEN SATURATION: 98 % | HEART RATE: 58 BPM

## 2023-08-17 DIAGNOSIS — R00.0 SINUS TACHYCARDIA: ICD-10-CM

## 2023-08-17 DIAGNOSIS — R55 SYNCOPE AND COLLAPSE: Primary | ICD-10-CM

## 2023-08-17 PROCEDURE — 99213 OFFICE O/P EST LOW 20 MIN: CPT | Performed by: INTERNAL MEDICINE

## 2023-08-17 NOTE — PROGRESS NOTES
"Tracey Merrill  2000  [phone]      08/17/2023    [facility] CARDIOLOGY     Provider, No Known  [place] [id number]    Chief Complaint   Patient presents with    Syncope and collapse     Problem List  Vasovagal Syncope/Orthostatic  Event Monitor 3/26/19-4/24/19: NSR with sinus tachycardia, up to 132 bpm. Symptoms correlated with Sinus Tachycardia  Tilt Table Test 4/30/19: positive vasodepressor response at 19 minutes of head up tilt, test terminated due to syncope. BP dropped to 72/52 just prior to syncope, HR 57  Echocardiogram 9/2021: EF 65%, no significant valve abnormalities   Mild obesity  Anxiety  DMr  Frequent HAs    Allergies  Allergies   Allergen Reactions    Latex Rash     PT stated allergic to latex powder    Midodrine Hcl Itching     Itching and chill bumps       Current Medications  No current outpatient medications on file.    History of Present Illness   HPI    Pt presents for follow up of VVS. Since the pt has seen us, pt with an episode of vasovagal near syncope following stomach cramps.  She did not pass out completely.  Otherwise she has done well.  She decided not to pursue the weight loss medications.  Blood pressure and heart rates been stable at home.  No other cardiovascular complaint.  Continues to work as a hairdresser.         Vitals:    08/17/23 1234   BP: 110/76   BP Location: Left arm   Patient Position: Sitting   Pulse: 58   SpO2: 98%   Weight: (!) 139 kg (306 lb)   Height: 177.8 cm (70\")       PE:  General: NAD  Neck: no JVD, no carotid bruits, no TM  Heart RRR, NL S1, S2, S4 present, no rubs, murmurs  Lungs: CTA, no wheezes, rhonchi, or rales  Abd: soft, non-tender, NL BS  Ext: No musculoskeletal deformities, no edema, cyanosis, or clubbing  Psych: normal mood and affect    Diagnostic Data:  Procedures    1. Syncope and collapse    2. Sinus tachycardia        Plan:      1. Vasovagal Syncope:  1 near syncopal episode most likely " due to stomach cramps.  Otherwise doing well.  Off medical therapy.  Continue current therapy.  -  Encourage exercise, routine salt use, and maintaining hydration..         2. Sinus tachycardia/Palpitations: Heart rate is improved at this time.    F/up in 24 months

## 2023-12-10 ENCOUNTER — HOSPITAL ENCOUNTER (EMERGENCY)
Facility: HOSPITAL | Age: 23
Discharge: HOME | End: 2023-12-10
Attending: EMERGENCY MEDICINE | Admitting: EMERGENCY MEDICINE
Payer: COMMERCIAL

## 2023-12-10 ENCOUNTER — APPOINTMENT (OUTPATIENT)
Dept: CT IMAGING | Facility: HOSPITAL | Age: 23
End: 2023-12-10
Payer: COMMERCIAL

## 2023-12-10 VITALS
DIASTOLIC BLOOD PRESSURE: 94 MMHG | HEART RATE: 99 BPM | OXYGEN SATURATION: 100 % | TEMPERATURE: 97.8 F | RESPIRATION RATE: 22 BRPM | WEIGHT: 293 LBS | BODY MASS INDEX: 43.4 KG/M2 | HEIGHT: 69 IN | SYSTOLIC BLOOD PRESSURE: 134 MMHG

## 2023-12-10 DIAGNOSIS — M54.50 ACUTE RIGHT-SIDED LOW BACK PAIN WITHOUT SCIATICA: ICD-10-CM

## 2023-12-10 DIAGNOSIS — R10.84 ACUTE GENERALIZED ABDOMINAL PAIN: Primary | ICD-10-CM

## 2023-12-10 LAB
ALBUMIN SERPL-MCNC: 4.1 G/DL (ref 3.5–5.2)
ALBUMIN/GLOB SERPL: 1.2 G/DL
ALP SERPL-CCNC: 89 U/L (ref 39–117)
ALT SERPL W P-5'-P-CCNC: 19 U/L (ref 1–33)
ANION GAP SERPL CALCULATED.3IONS-SCNC: 10 MMOL/L (ref 5–15)
AST SERPL-CCNC: 19 U/L (ref 1–32)
B-HCG UR QL: NEGATIVE
BASOPHILS # BLD AUTO: 0.08 10*3/MM3 (ref 0–0.2)
BASOPHILS NFR BLD AUTO: 0.7 % (ref 0–1.5)
BILIRUB SERPL-MCNC: 0.3 MG/DL (ref 0–1.2)
BILIRUB UR QL STRIP: NEGATIVE
BUN SERPL-MCNC: 11 MG/DL (ref 6–20)
BUN/CREAT SERPL: 18 (ref 7–25)
CALCIUM SPEC-SCNC: 9.2 MG/DL (ref 8.6–10.5)
CHLORIDE SERPL-SCNC: 104 MMOL/L (ref 98–107)
CLARITY UR: CLEAR
CO2 SERPL-SCNC: 23 MMOL/L (ref 22–29)
COLOR UR: YELLOW
CREAT SERPL-MCNC: 0.61 MG/DL (ref 0.57–1)
DEPRECATED RDW RBC AUTO: 38.9 FL (ref 37–54)
EGFRCR SERPLBLD CKD-EPI 2021: 127.4 ML/MIN/1.73
EOSINOPHIL # BLD AUTO: 0.27 10*3/MM3 (ref 0–0.4)
EOSINOPHIL NFR BLD AUTO: 2.3 % (ref 0.3–6.2)
ERYTHROCYTE [DISTWIDTH] IN BLOOD BY AUTOMATED COUNT: 12.6 % (ref 12.3–15.4)
EXPIRATION DATE: NORMAL
GLOBULIN UR ELPH-MCNC: 3.3 GM/DL
GLUCOSE SERPL-MCNC: 88 MG/DL (ref 65–99)
GLUCOSE UR STRIP-MCNC: NEGATIVE MG/DL
HCT VFR BLD AUTO: 40 % (ref 34–46.6)
HGB BLD-MCNC: 13.4 G/DL (ref 12–15.9)
HGB UR QL STRIP.AUTO: NEGATIVE
IMM GRANULOCYTES # BLD AUTO: 0.05 10*3/MM3 (ref 0–0.05)
IMM GRANULOCYTES NFR BLD AUTO: 0.4 % (ref 0–0.5)
INTERNAL NEGATIVE CONTROL: NEGATIVE
INTERNAL POSITIVE CONTROL: POSITIVE
KETONES UR QL STRIP: NEGATIVE
LEUKOCYTE ESTERASE UR QL STRIP.AUTO: NEGATIVE
LIPASE SERPL-CCNC: 22 U/L (ref 13–60)
LYMPHOCYTES # BLD AUTO: 3.56 10*3/MM3 (ref 0.7–3.1)
LYMPHOCYTES NFR BLD AUTO: 30.7 % (ref 19.6–45.3)
Lab: NORMAL
MCH RBC QN AUTO: 28.7 PG (ref 26.6–33)
MCHC RBC AUTO-ENTMCNC: 33.5 G/DL (ref 31.5–35.7)
MCV RBC AUTO: 85.7 FL (ref 79–97)
MONOCYTES # BLD AUTO: 0.47 10*3/MM3 (ref 0.1–0.9)
MONOCYTES NFR BLD AUTO: 4 % (ref 5–12)
NEUTROPHILS NFR BLD AUTO: 61.9 % (ref 42.7–76)
NEUTROPHILS NFR BLD AUTO: 7.18 10*3/MM3 (ref 1.7–7)
NITRITE UR QL STRIP: NEGATIVE
NRBC BLD AUTO-RTO: 0 /100 WBC (ref 0–0.2)
PH UR STRIP.AUTO: 7 [PH] (ref 5–8)
PLATELET # BLD AUTO: 332 10*3/MM3 (ref 140–450)
PMV BLD AUTO: 9.8 FL (ref 6–12)
POTASSIUM SERPL-SCNC: 4.2 MMOL/L (ref 3.5–5.2)
PROT SERPL-MCNC: 7.4 G/DL (ref 6–8.5)
PROT UR QL STRIP: NEGATIVE
RBC # BLD AUTO: 4.67 10*6/MM3 (ref 3.77–5.28)
SODIUM SERPL-SCNC: 137 MMOL/L (ref 136–145)
SP GR UR STRIP: 1.01 (ref 1–1.03)
UROBILINOGEN UR QL STRIP: NORMAL
WBC NRBC COR # BLD AUTO: 11.61 10*3/MM3 (ref 3.4–10.8)

## 2023-12-10 PROCEDURE — 85025 COMPLETE CBC W/AUTO DIFF WBC: CPT | Performed by: EMERGENCY MEDICINE

## 2023-12-10 PROCEDURE — 80053 COMPREHEN METABOLIC PANEL: CPT | Performed by: EMERGENCY MEDICINE

## 2023-12-10 PROCEDURE — 83690 ASSAY OF LIPASE: CPT | Performed by: EMERGENCY MEDICINE

## 2023-12-10 PROCEDURE — 81003 URINALYSIS AUTO W/O SCOPE: CPT | Performed by: EMERGENCY MEDICINE

## 2023-12-10 PROCEDURE — 25510000001 IOPAMIDOL 61 % SOLUTION: Performed by: EMERGENCY MEDICINE

## 2023-12-10 PROCEDURE — 99285 EMERGENCY DEPT VISIT HI MDM: CPT

## 2023-12-10 PROCEDURE — 81025 URINE PREGNANCY TEST: CPT | Performed by: EMERGENCY MEDICINE

## 2023-12-10 PROCEDURE — 74177 CT ABD & PELVIS W/CONTRAST: CPT

## 2023-12-10 RX ADMIN — IOPAMIDOL 85 ML: 612 INJECTION, SOLUTION INTRAVENOUS at 16:33

## 2024-04-15 ENCOUNTER — HOSPITAL ENCOUNTER (OUTPATIENT)
Dept: CT IMAGING | Facility: HOSPITAL | Age: 24
Discharge: HOME | End: 2024-04-15
Admitting: INTERNAL MEDICINE
Payer: COMMERCIAL

## 2024-04-15 DIAGNOSIS — R91.1 PULMONARY NODULE: ICD-10-CM

## 2024-04-15 PROCEDURE — 71250 CT THORAX DX C-: CPT
